# Patient Record
Sex: MALE | Race: NATIVE HAWAIIAN OR OTHER PACIFIC ISLANDER | URBAN - METROPOLITAN AREA
[De-identification: names, ages, dates, MRNs, and addresses within clinical notes are randomized per-mention and may not be internally consistent; named-entity substitution may affect disease eponyms.]

---

## 2020-08-27 ENCOUNTER — OFFICE VISIT (OUTPATIENT)
Dept: URBAN - METROPOLITAN AREA CLINIC 54 | Facility: CLINIC | Age: 20
End: 2020-08-27
Payer: COMMERCIAL

## 2020-08-27 DIAGNOSIS — R10.13 EPIGASTRIC ABDOMINAL PAIN: ICD-10-CM

## 2020-08-27 DIAGNOSIS — R74.8 ABNORMAL LIVER ENZYMES: ICD-10-CM

## 2020-08-27 DIAGNOSIS — K92.1 HEMATOCHEZIA: ICD-10-CM

## 2020-08-27 PROCEDURE — G8420 CALC BMI NORM PARAMETERS: HCPCS | Performed by: INTERNAL MEDICINE

## 2020-08-27 PROCEDURE — 99213 OFFICE O/P EST LOW 20 MIN: CPT | Performed by: INTERNAL MEDICINE

## 2020-08-27 PROCEDURE — 1036F TOBACCO NON-USER: CPT | Performed by: INTERNAL MEDICINE

## 2020-08-27 PROCEDURE — G8427 DOCREV CUR MEDS BY ELIG CLIN: HCPCS | Performed by: INTERNAL MEDICINE

## 2020-08-27 RX ORDER — MUPIROCIN 20 MG/G
1 APPLICATION OINTMENT TOPICAL THREE TIMES A DAY
Status: ACTIVE | COMMUNITY

## 2020-08-27 RX ORDER — PREDNISONE 20 MG/1
1 TABLET TABLET ORAL ONCE A DAY
Status: ACTIVE | COMMUNITY

## 2020-08-27 RX ORDER — FAMOTIDINE 20 MG/1
1 TABLET AT BEDTIME AS NEEDED TABLET, FILM COATED ORAL ONCE A DAY
Status: ACTIVE | COMMUNITY

## 2020-08-27 RX ORDER — DICYCLOMINE HYDROCHLORIDE 20 MG/1
1 TABLET TABLET ORAL THREE TIMES A DAY
Status: ACTIVE | COMMUNITY

## 2020-08-27 RX ORDER — METRONIDAZOLE 500 MG/1
1 TABLET TABLET ORAL
Status: ACTIVE | COMMUNITY

## 2020-08-27 RX ORDER — DOCUSATE SODIUM 100 MG/1
CAPSULE ORAL
Qty: 0 | Refills: 0 | Status: ACTIVE | COMMUNITY
Start: 1900-01-01

## 2020-08-27 RX ORDER — CIPROFLOXACIN 500 MG/1
1 TABLET TABLET, FILM COATED ORAL
Status: ACTIVE | COMMUNITY

## 2020-08-27 RX ORDER — DIPHENHYDRAMINE HYDROCHLORIDE 25 MG/1
1 CAPSULE AT BEDTIME AS NEEDED CAPSULE ORAL ONCE A DAY
Status: ACTIVE | COMMUNITY

## 2020-08-27 RX ORDER — BUDESONIDE 3 MG/1
TAKE 3 CAPSULES BY ORAL ROUTE ONCE DAILY FOR 4 WEEKS THEN DECREASE TO 2 CAPSULES DAILY FOR 2 WEEKS THEN 1 CAPSULE FOR 2 WEEKS THEN STOP CAPSULE, COATED PELLETS ORAL 1
Qty: 132 | Refills: 0 | Status: DISCONTINUED | COMMUNITY
Start: 2019-01-04

## 2020-08-27 NOTE — HPI-TODAY'S VISIT:
Follow-up recent ER visit with mid abdominal pain and blood in the stool CT with equivocal colitis.  Prior history of budesonide for microscopic colitis with otherwise normal colonoscopy 2018.  Prior intermittent bleeding from hemorrhoids.  It is not clear whether he is currently having dark stools or rectal bleeding as he is not a good historian.  He is a little slow responding to questions.  He understands and speaks English fairly well but his sister is here to help with interpretation.  He has gained some weight.  He has had no nausea or vomiting.  He has had an episode of Salmonella 2 years ago.  He is not currently having diarrhea although that is been a problem in the past.  He actually has tendency toward constipation relieved by MiraLAX and a stool softener.  He has had no fever or coronavirus symptoms.  His recent labs were normal except for mild elevation of ALT and AST with no prior history of liver disease, alcohol use or other risk factors for hepatitis.  He has not had a prior upper endoscopy or abdominal ultrasound and has not had prior abdominal surgery.

## 2020-08-27 NOTE — HPI-OTHER HISTORIES
>> prior visits  evaluation for diarrhea which has been watery, and he has been having about 6 bowel movements per day when the symptoms occur. A copy of this document will be sent to the referring provider. The diarrhea has been present daily since it began approximately 1 month ago. Besides diarrhea , the patient complains of abdominal pain and hematochezia. They have had hematochezia for 1 week. When there is blood, the color is bright red, and the bleeding is intermittently. He reports abdominal pain which is dull and aching in nature. It has been present for approximately 1 month. The pain is located in the lower quadrants. When the pain occurs it is mild in severity, and has a crampy quality. The pain seems to occur at random times. The pain is improved with nothing in particular. He has not traveled out of the country. The patient states no additional potential causes. Diagnostic Studies: Diagnostic Studies labwork and CT done recently at hospital PMH-NONE PSH-TESTICULAR SURGERY ALL-NKDA SH-DENIES TOBACCO ETOH OR DRUG USE FH-DENIES IBD SYMPTOMS  10/4/18: Patient returns for follow up. He underwent colonoscopy on 8/23/18, benign bx of TI, random colon bx show microscopic colitis (lymphocytic). A prescription for budesonide was sent to [facility], however patient's father states it was never mailed to them. Patient has been doing well. He does not have any further diarrhea or bloody stools. No abdominal pain, N/V.   FOLLOW UP 1/29/19-PATIENT PRESENTS FOR FOLLOW UP. HE RECENTLY HAD STOOL TESTNING WHICH WAS NEGATIVE FOR INFECTION OR INFLAMMATION. HE COMPLAINS OF BLOOD ON TOILET TISSUE WHEN HE WIPES. HE DOES STRAIN SOME WHEN GOING TO BATHROOM. HE HAS HAD A RECENT RECTAL EXAM. FOLLOW UP 3/26/19-PATIENT PRESENTS FOR FOLLOW UP. HE STATES HE HAD ANOTHER EPISODE OF HEMORRHOID BLEEDING RECENTLY. HE HAS BEEN INCONSISTENTLY TAKING BENEFIBER AND DID NOT TAKE ANUSOL CREAM FROM LAST VISIT. HE STATES HE IS HAVING 2 BOWEL MOVEMENTS DAILY WHICH ARE SOFT. HE DENIES ANY NEW SYMPTOMS. FOLLOW UP 5/21/19-PATIENT PRESENTS FOR FOLLOW UP. HE IS NOT TAKING BENEFIBER BUT DID USE ANUSOL CREAM WHICH STOPPED HIS HEMORRHOIDAL DISCOMFORT. HE DENIES CURRENT BLEEDING.   8/20/19: Patient returns with c/o recurrent rectal bleeding x 1 episode on 8/7/19. He reports a large amount of blood in his underwear. Denies any further bleeding since then. No rectal pain. He admits to straning with BMs. Stool is occasionally hard. Denies any rectal pain. Reports anal itching from hemorrhoids. Had been using Anusol-HC, but is out. He has been taking Benefiber morning and night. He has not tried any other laxatives or stool softeners. Denies any abdominal pain, N/V.    FOLLOW UP 10/29/19-PATIENT PRESENTS FOR FOLLOW UP. HE IS TAKING BENEFIBER WHICH IS HELPING HIS CONSTIPATION AND STRAINING.

## 2020-08-28 LAB
A/G RATIO: 1.7
ALBUMIN: 5
ALKALINE PHOSPHATASE: 105
ALT (SGPT): 87
AST (SGOT): 53
BASO (ABSOLUTE): 0
BASOS: 0
BILIRUBIN, TOTAL: 0.3
BUN/CREATININE RATIO: 15
BUN: 10
CALCIUM: 9.9
CARBON DIOXIDE, TOTAL: 19
CHLORIDE: 103
CREATININE: 0.65
EGFR IF AFRICN AM: 162
EGFR IF NONAFRICN AM: 140
EOS (ABSOLUTE): 0.2
EOS: 2
GLOBULIN, TOTAL: 2.9
GLUCOSE: 96
HBSAG SCREEN: NEGATIVE
HCV AB: <0.1
HEMATOCRIT: 47.6
HEMATOLOGY COMMENTS:: (no result)
HEMOGLOBIN: 15.4
HEP A AB, IGM: NEGATIVE
HEP A AB, TOTAL: NEGATIVE
HEP B CORE AB, IGM: NEGATIVE
HEP B CORE AB, TOT: NEGATIVE
HEP B SURFACE AB, QUAL: NON REACTIVE
HEP BE AB: NEGATIVE
HEP BE AG: NEGATIVE
IMMATURE CELLS: (no result)
IMMATURE GRANS (ABS): 0
IMMATURE GRANULOCYTES: 0
INTERPRETATION:: (no result)
LYMPHS (ABSOLUTE): 2.6
LYMPHS: 37
MCH: 26.5
MCHC: 32.4
MCV: 82
MONOCYTES(ABSOLUTE): 0.4
MONOCYTES: 6
NEUTROPHILS (ABSOLUTE): 3.8
NEUTROPHILS: 55
NRBC: (no result)
PLATELETS: 299
POTASSIUM: 4.5
PROTEIN, TOTAL: 7.9
RBC: 5.81
RDW: 13
SODIUM: 140
WBC: 7.1

## 2020-09-11 ENCOUNTER — OFFICE VISIT (OUTPATIENT)
Dept: URBAN - METROPOLITAN AREA CLINIC 53 | Facility: CLINIC | Age: 20
End: 2020-09-11
Payer: COMMERCIAL

## 2020-09-11 DIAGNOSIS — K76.0 FATTY (CHANGE OF) LIVER, NOT ELSEWHERE CLASSIFIED: ICD-10-CM

## 2020-09-11 PROCEDURE — 76700 US EXAM ABDOM COMPLETE: CPT | Performed by: INTERNAL MEDICINE

## 2020-09-30 ENCOUNTER — OFFICE VISIT (OUTPATIENT)
Dept: URBAN - METROPOLITAN AREA CLINIC 54 | Facility: CLINIC | Age: 20
End: 2020-09-30
Payer: COMMERCIAL

## 2020-09-30 DIAGNOSIS — R10.9 ABDOMINAL PAIN: ICD-10-CM

## 2020-09-30 DIAGNOSIS — K52.9 COLITIS: ICD-10-CM

## 2020-09-30 DIAGNOSIS — K92.1 HEMATOCHEZIA: ICD-10-CM

## 2020-09-30 PROCEDURE — 99213 OFFICE O/P EST LOW 20 MIN: CPT | Performed by: INTERNAL MEDICINE

## 2020-09-30 PROCEDURE — 1036F TOBACCO NON-USER: CPT | Performed by: INTERNAL MEDICINE

## 2020-09-30 PROCEDURE — G8427 DOCREV CUR MEDS BY ELIG CLIN: HCPCS | Performed by: INTERNAL MEDICINE

## 2020-09-30 PROCEDURE — G8420 CALC BMI NORM PARAMETERS: HCPCS | Performed by: INTERNAL MEDICINE

## 2020-09-30 RX ORDER — METRONIDAZOLE 500 MG/1
1 TABLET TABLET ORAL
Status: ACTIVE | COMMUNITY

## 2020-09-30 RX ORDER — PREDNISONE 20 MG/1
1 TABLET TABLET ORAL ONCE A DAY
Status: ACTIVE | COMMUNITY

## 2020-09-30 RX ORDER — DIPHENHYDRAMINE HYDROCHLORIDE 25 MG/1
1 CAPSULE AT BEDTIME AS NEEDED CAPSULE ORAL ONCE A DAY
Status: ACTIVE | COMMUNITY

## 2020-09-30 RX ORDER — DICYCLOMINE HYDROCHLORIDE 20 MG/1
1 TABLET TABLET ORAL THREE TIMES A DAY
Status: ACTIVE | COMMUNITY

## 2020-09-30 RX ORDER — FAMOTIDINE 20 MG/1
1 TABLET AT BEDTIME AS NEEDED TABLET, FILM COATED ORAL ONCE A DAY
Status: ACTIVE | COMMUNITY

## 2020-09-30 RX ORDER — CIPROFLOXACIN 500 MG/1
1 TABLET TABLET, FILM COATED ORAL
Status: ACTIVE | COMMUNITY

## 2020-09-30 RX ORDER — DOCUSATE SODIUM 100 MG/1
CAPSULE ORAL
Qty: 0 | Refills: 0 | Status: ACTIVE | COMMUNITY
Start: 1900-01-01

## 2020-09-30 RX ORDER — MUPIROCIN 20 MG/G
1 APPLICATION OINTMENT TOPICAL THREE TIMES A DAY
Status: ACTIVE | COMMUNITY

## 2020-09-30 NOTE — HPI-TODAY'S VISIT:
Follow-up visit for this 20-year-old  male.  He understands English but is a very poor historian.  He is accompanied by his sister who provides translation and amplification of the history.  He continues to have vague mid abdominal pain somewhat worse after meals.  He has intermittent diarrhea.  There is a question of microscopic colitis treated with budesonide 2 years ago.  He has not had an upper endoscopy.  There is no nausea or vomiting.  He has lost a small amount of weight.  There is been no recent bleeding.  He is currently taking dicyclomine and famotidine with some improvement in symptoms. Follow-up visit for this 20-year-old  male.  He understands English but is a very poor historian.  He is accompanied by his sister who provides translation and amplification of the history.  He continues to have vague mid abdominal pain somewhat worse after meals.  He has intermittent diarrhea.  There is a question of microscopic colitis treated with budesonide 2 years ago.  He has not had an upper endoscopy.  There is no nausea or vomiting.  He has lost a small amount of weight.  There is been no recent bleeding.  He is currently taking dicyclomine and famotidine with some improvement in symptoms.

## 2020-10-19 ENCOUNTER — OFFICE VISIT (OUTPATIENT)
Dept: URBAN - METROPOLITAN AREA SURGERY CENTER 14 | Facility: SURGERY CENTER | Age: 20
End: 2020-10-19
Payer: COMMERCIAL

## 2020-10-19 DIAGNOSIS — R19.4 CHANGE IN BOWEL HABIT: ICD-10-CM

## 2020-10-19 DIAGNOSIS — K63.89 BACTERIAL OVERGROWTH SYNDROME: ICD-10-CM

## 2020-10-19 DIAGNOSIS — K29.30 CHRONIC SUPERFICIAL GASTRITIS: ICD-10-CM

## 2020-10-19 DIAGNOSIS — K31.89 ACQUIRED DEFORMITY OF DUODENUM: ICD-10-CM

## 2020-10-19 PROCEDURE — 45380 COLONOSCOPY AND BIOPSY: CPT | Performed by: INTERNAL MEDICINE

## 2020-10-19 PROCEDURE — G8907 PT DOC NO EVENTS ON DISCHARG: HCPCS | Performed by: INTERNAL MEDICINE

## 2020-10-19 PROCEDURE — G9937 DIG OR SURV COLSCO: HCPCS | Performed by: INTERNAL MEDICINE

## 2020-10-19 PROCEDURE — 43239 EGD BIOPSY SINGLE/MULTIPLE: CPT | Performed by: INTERNAL MEDICINE

## 2020-10-19 RX ORDER — MUPIROCIN 20 MG/G
1 APPLICATION OINTMENT TOPICAL THREE TIMES A DAY
Status: ACTIVE | COMMUNITY

## 2020-10-19 RX ORDER — CIPROFLOXACIN 500 MG/1
1 TABLET TABLET, FILM COATED ORAL
Status: ACTIVE | COMMUNITY

## 2020-10-19 RX ORDER — DOCUSATE SODIUM 100 MG/1
CAPSULE ORAL
Qty: 0 | Refills: 0 | Status: ACTIVE | COMMUNITY
Start: 1900-01-01

## 2020-10-19 RX ORDER — DIPHENHYDRAMINE HYDROCHLORIDE 25 MG/1
1 CAPSULE AT BEDTIME AS NEEDED CAPSULE ORAL ONCE A DAY
Status: ACTIVE | COMMUNITY

## 2020-10-19 RX ORDER — FAMOTIDINE 20 MG/1
1 TABLET AT BEDTIME AS NEEDED TABLET, FILM COATED ORAL ONCE A DAY
Status: ACTIVE | COMMUNITY

## 2020-10-19 RX ORDER — DICYCLOMINE HYDROCHLORIDE 20 MG/1
1 TABLET TABLET ORAL THREE TIMES A DAY
Status: ACTIVE | COMMUNITY

## 2020-10-19 RX ORDER — PREDNISONE 20 MG/1
1 TABLET TABLET ORAL ONCE A DAY
Status: ACTIVE | COMMUNITY

## 2020-10-19 RX ORDER — METRONIDAZOLE 500 MG/1
1 TABLET TABLET ORAL
Status: ACTIVE | COMMUNITY

## 2020-12-11 ENCOUNTER — WEB ENCOUNTER (OUTPATIENT)
Dept: URBAN - METROPOLITAN AREA CLINIC 54 | Facility: CLINIC | Age: 20
End: 2020-12-11

## 2020-12-11 ENCOUNTER — OFFICE VISIT (OUTPATIENT)
Dept: URBAN - METROPOLITAN AREA CLINIC 54 | Facility: CLINIC | Age: 20
End: 2020-12-11
Payer: COMMERCIAL

## 2020-12-11 DIAGNOSIS — K92.1 HEMATOCHEZIA: ICD-10-CM

## 2020-12-11 PROCEDURE — G8482 FLU IMMUNIZE ORDER/ADMIN: HCPCS | Performed by: INTERNAL MEDICINE

## 2020-12-11 PROCEDURE — G8420 CALC BMI NORM PARAMETERS: HCPCS | Performed by: INTERNAL MEDICINE

## 2020-12-11 PROCEDURE — 99213 OFFICE O/P EST LOW 20 MIN: CPT | Performed by: INTERNAL MEDICINE

## 2020-12-11 PROCEDURE — 1036F TOBACCO NON-USER: CPT | Performed by: INTERNAL MEDICINE

## 2020-12-11 PROCEDURE — G8427 DOCREV CUR MEDS BY ELIG CLIN: HCPCS | Performed by: INTERNAL MEDICINE

## 2020-12-11 RX ORDER — DOCUSATE SODIUM 100 MG/1
CAPSULE ORAL
Qty: 0 | Refills: 0 | Status: ACTIVE | COMMUNITY
Start: 1900-01-01

## 2020-12-11 RX ORDER — FAMOTIDINE 20 MG/1
1 TABLET AT BEDTIME AS NEEDED TABLET, FILM COATED ORAL ONCE A DAY
Status: ACTIVE | COMMUNITY

## 2020-12-11 RX ORDER — MUPIROCIN 20 MG/G
1 APPLICATION OINTMENT TOPICAL THREE TIMES A DAY
Status: ACTIVE | COMMUNITY

## 2020-12-11 RX ORDER — DICYCLOMINE HYDROCHLORIDE 20 MG/1
1 TABLET TABLET ORAL THREE TIMES A DAY
Status: ACTIVE | COMMUNITY

## 2020-12-11 RX ORDER — METRONIDAZOLE 500 MG/1
1 TABLET TABLET ORAL
Status: DISCONTINUED | COMMUNITY

## 2020-12-11 RX ORDER — PREDNISONE 20 MG/1
1 TABLET TABLET ORAL ONCE A DAY
Status: ACTIVE | COMMUNITY

## 2020-12-11 RX ORDER — DIPHENHYDRAMINE HYDROCHLORIDE 25 MG/1
1 CAPSULE AT BEDTIME AS NEEDED CAPSULE ORAL ONCE A DAY
Status: ACTIVE | COMMUNITY

## 2020-12-11 RX ORDER — CIPROFLOXACIN 500 MG/1
1 TABLET TABLET, FILM COATED ORAL
Status: DISCONTINUED | COMMUNITY

## 2020-12-11 NOTE — HPI-OTHER HISTORIES
>>prior visits  The patient is a 17 year old,  or  male , who presents on referral from ER, for a gastroenterology evaluation for diarrhea which has been watery, and he has been having about 6 bowel movements per day when the symptoms occur. A copy of this document will be sent to the referring provider. The diarrhea has been present daily since it began approximately 1 month ago. Besides diarrhea , the patient complains of abdominal pain and hematochezia. They have had hematochezia for 1 week. When there is blood, the color is bright red, and the bleeding is intermittently. He reports abdominal pain which is dull and aching in nature. It has been present for approximately 1 month. The pain is located in the lower quadrants. When the pain occurs it is mild in severity, and has a crampy quality. The pain seems to occur at random times. The pain is improved with nothing in particular. He has not traveled out of the country. The patient states no additional potential causes. Diagnostic Studies: Diagnostic Studies labwork and CT done recently at hospital PMH-NONE PSH-TESTICULAR SURGERY ALL-NKDA SH-DENIES TOBACCO ETOH OR DRUG USE FH-DENIES IBD SYMPTOMS   10/4/18: Patient returns for follow up. He underwent colonoscopy on 8/23/18, benign bx of TI, random colon bx show microscopic colitis (lymphocytic). A prescription for budesonide was sent to [facility] pharmacy, however patient's father states it was never mailed to them. Patient has been doing well. He does not have any further diarrhea or bloody stools. No abdominal pain, N/V.   FOLLOW UP 1/29/19-PATIENT PRESENTS FOR FOLLOW UP. HE RECENTLY HAD STOOL TESTNING WHICH WAS NEGATIVE FOR INFECTION OR INFLAMMATION. HE COMPLAINS OF BLOOD ON TOILET TISSUE WHEN HE WIPES. HE DOES STRAIN SOME WHEN GOING TO BATHROOM. HE HAS HAD A RECENT RECTAL EXAM.   FOLLOW UP 3/26/19-PATIENT PRESENTS FOR FOLLOW UP. HE STATES HE HAD ANOTHER EPISODE OF HEMORRHOID BLEEDING RECENTLY. HE HAS BEEN INCONSISTENTLY TAKING BENEFIBER AND DID NOT TAKE ANUSOL CREAM FROM LAST VISIT. HE STATES HE IS HAVING 2 BOWEL MOVEMENTS DAILY WHICH ARE SOFT. HE DENIES ANY NEW SYMPTOMS.   FOLLOW UP 5/21/19-PATIENT PRESENTS FOR FOLLOW UP. HE IS NOT TAKING BENEFIBER BUT DID USE ANUSOL CREAM WHICH STOPPED HIS HEMORRHOIDAL DISCOMFORT. HE DENIES CURRENT BLEEDING. 8/20/19: Patient returns with c/o recurrent rectal bleeding x 1 episode on 8/7/19. He reports a large amount of blood in his underwear. Denies any further bleeding since then. No rectal pain. He admits to straning with BMs. Stool is occasionally hard. Denies any rectal pain. Reports anal itching from hemorrhoids. Had been using Anusol-HC, but is out. He has been taking Benefiber morning and night. He has not tried any other laxatives or stool softeners. Denies any abdominal pain, N/V.   FOLLOW UP 10/29/19-PATIENT PRESENTS FOR FOLLOW UP. HE IS TAKING BENEFIBER WHICH IS HELPING HIS CONSTIPATION AND STRAINING.

## 2020-12-11 NOTE — PHYSICAL EXAM SKIN:
thickening and fissuring dorsal aspects of hands and fingers , no suspicious lesions , no areas of discoloration , no jaundice present , good turgor , no masses , no tenderness on palpation

## 2020-12-11 NOTE — HPI-TODAY'S VISIT:
Follow-up after upper endoscopy and colonoscopy, follow-up after ER visit.  He was seen in the ER on 10 5 and underwent CT which had revealed suspicious thickening of the sigmoid and rectum.  Colonoscopy done since then has revealed normal mucosa with no inflammatory changes either visually or on random biopsies.  An upper endoscopy was also unremarkable with negative biopsies of stomach and duodenum.  He has occasional minor rectal bleeding related to hemorrhoids and occasional abdominal pain but not severe.  He is also noted some mild thickening and fissuring on the backs of his hands.  He is a somewhat difficult historian assisted with English by his sister.  It is possible that the breaking out has followed treatment with Cipro and cephalexin from the 10 5 ER visit.  He otherwise feels well.  >>prior visit  Follow-up visit for this 20-year-old  male.  He understands English but is a very poor historian.  He is accompanied by his sister who provides translation and amplification of the history.  He continues to have vague mid abdominal pain somewhat worse after meals.  He has intermittent diarrhea.  There is a question of microscopic colitis treated with budesonide 2 years ago.  He has not had an upper endoscopy.  There is no nausea or vomiting.  He has lost a small amount of weight.  There is been no recent bleeding.  He is currently taking dicyclomine and famotidine with some improvement in symptoms. Follow-up visit for this 20-year-old  male.  He understands English but is a very poor historian.  He is accompanied by his sister who provides translation and amplification of the history.  He continues to have vague mid abdominal pain somewhat worse after meals.  He has intermittent diarrhea.  There is a question of microscopic colitis treated with budesonide 2 years ago.  He has not had an upper endoscopy.  There is no nausea or vomiting.  He has lost a small amount of weight.  There is been no recent bleeding.  He is currently taking dicyclomine and famotidine with some improvement in symptoms.

## 2020-12-30 ENCOUNTER — TELEPHONE ENCOUNTER (OUTPATIENT)
Dept: URBAN - METROPOLITAN AREA CLINIC 54 | Facility: CLINIC | Age: 20
End: 2020-12-30

## 2021-01-06 ENCOUNTER — TELEPHONE ENCOUNTER (OUTPATIENT)
Dept: URBAN - METROPOLITAN AREA CLINIC 54 | Facility: CLINIC | Age: 21
End: 2021-01-06

## 2021-04-12 ENCOUNTER — TELEPHONE ENCOUNTER (OUTPATIENT)
Dept: URBAN - METROPOLITAN AREA CLINIC 74 | Facility: CLINIC | Age: 21
End: 2021-04-12

## 2021-05-12 ENCOUNTER — OFFICE VISIT (OUTPATIENT)
Dept: URBAN - METROPOLITAN AREA CLINIC 54 | Facility: CLINIC | Age: 21
End: 2021-05-12
Payer: COMMERCIAL

## 2021-05-12 ENCOUNTER — WEB ENCOUNTER (OUTPATIENT)
Dept: URBAN - METROPOLITAN AREA CLINIC 54 | Facility: CLINIC | Age: 21
End: 2021-05-12

## 2021-05-12 DIAGNOSIS — K92.1 HEMATOCHEZIA: ICD-10-CM

## 2021-05-12 PROCEDURE — 99213 OFFICE O/P EST LOW 20 MIN: CPT | Performed by: INTERNAL MEDICINE

## 2021-05-12 RX ORDER — PREDNISONE 20 MG/1
1 TABLET TABLET ORAL ONCE A DAY
Status: ACTIVE | COMMUNITY

## 2021-05-12 RX ORDER — DICYCLOMINE HYDROCHLORIDE 20 MG/1
1 TABLET TABLET ORAL THREE TIMES A DAY
Status: ACTIVE | COMMUNITY

## 2021-05-12 RX ORDER — MUPIROCIN 20 MG/G
1 APPLICATION OINTMENT TOPICAL THREE TIMES A DAY
Status: ACTIVE | COMMUNITY

## 2021-05-12 RX ORDER — FAMOTIDINE 20 MG/1
1 TABLET AT BEDTIME AS NEEDED TABLET, FILM COATED ORAL ONCE A DAY
Status: ACTIVE | COMMUNITY

## 2021-05-12 RX ORDER — DOCUSATE SODIUM 100 MG/1
CAPSULE ORAL
Qty: 0 | Refills: 0 | Status: ACTIVE | COMMUNITY
Start: 1900-01-01

## 2021-05-12 RX ORDER — DIPHENHYDRAMINE HYDROCHLORIDE 25 MG/1
1 CAPSULE AT BEDTIME AS NEEDED CAPSULE ORAL ONCE A DAY
Status: ACTIVE | COMMUNITY

## 2021-05-12 RX ORDER — DICYCLOMINE HYDROCHLORIDE 20 MG/1
1 TABLET TABLET ORAL THREE TIMES A DAY
Qty: 180 TABLET | Refills: 1

## 2021-05-12 NOTE — HPI-TODAY'S VISIT:
20-year-old man with minor recurrent rectal bleeding and persistent left lower quadrant abdominal pain.  The pain seems to be somewhat worse after meals but is inconsistent.  It is not severe.  He has taken dicyclomine in the past but erratically.  It does seem to help.  He was seen in the ER last month and a CT and labs performed which revealed none specific mild thickening of the sigmoid.  There were no other major abnormalities.  His lab work was unremarkable.  A previous colonoscopy done recently revealed minimal hemorrhoids and no other pathology in the colon.  An upper endoscopy was also unremarkable.  His weight has been stable.  He is a somewhat difficult historian as usual.   >> last visit Follow-up after upper endoscopy and colonoscopy, follow-up after ER visit.  He was seen in the ER on 10 5 and underwent CT which had revealed suspicious thickening of the sigmoid and rectum.  Colonoscopy done since then has revealed normal mucosa with no inflammatory changes either visually or on random biopsies.  An upper endoscopy was also unremarkable with negative biopsies of stomach and duodenum.  He has occasional minor rectal bleeding related to hemorrhoids and occasional abdominal pain but not severe.  He is also noted some mild thickening and fissuring on the backs of his hands.  He is a somewhat difficult historian assisted with English by his sister.  It is possible that the breaking out has followed treatment with Cipro and cephalexin from the 10 5 ER visit.  He otherwise feels well.  >>prior visit  Follow-up visit for this 20-year-old  male.  He understands English but is a very poor historian.  He is accompanied by his sister who provides translation and amplification of the history.  He continues to have vague mid abdominal pain somewhat worse after meals.  He has intermittent diarrhea.  There is a question of microscopic colitis treated with budesonide 2 years ago.  He has not had an upper endoscopy.  There is no nausea or vomiting.  He has lost a small amount of weight.  There is been no recent bleeding.  He is currently taking dicyclomine and famotidine with some improvement in symptoms. Follow-up visit for this 20-year-old  male.  He understands English but is a very poor historian.  He is accompanied by his sister who provides translation and amplification of the history.  He continues to have vague mid abdominal pain somewhat worse after meals.  He has intermittent diarrhea.  There is a question of microscopic colitis treated with budesonide 2 years ago.  He has not had an upper endoscopy.  There is no nausea or vomiting.  He has lost a small amount of weight.  There is been no recent bleeding.  He is currently taking dicyclomine and famotidine with some improvement in symptoms.

## 2021-05-12 NOTE — HPI-OTHER HISTORIES
>>prior visits  The patient is a 17 year old,  or  male , who presents on referral from ER, for a gastroenterology evaluation for diarrhea which has been watery, and he has been having about 6 bowel movements per day when the symptoms occur. A copy of this document will be sent to the referring provider. The diarrhea has been present daily since it began approximately 1 month ago. Besides diarrhea , the patient complains of abdominal pain and hematochezia. They have had hematochezia for 1 week. When there is blood, the color is bright red, and the bleeding is intermittently. He reports abdominal pain which is dull and aching in nature. It has been present for approximately 1 month. The pain is located in the lower quadrants. When the pain occurs it is mild in severity, and has a crampy quality. The pain seems to occur at random times. The pain is improved with nothing in particular. He has not traveled out of the country. The patient states no additional potential causes. Diagnostic Studies: Diagnostic Studies labwork and CT done recently at hospital PMH-NONE PSH-TESTICULAR SURGERY ALL-NKDA SH-DENIES TOBACCO ETOH OR DRUG USE FH-DENIES IBD SYMPTOMS   10/4/18: Patient returns for follow up. He underwent colonoscopy on 8/23/18, benign bx of TI, random colon bx show microscopic colitis (lymphocytic). A prescription for budesonide was sent to [facility] pharmacy, however patient's father states it was never mailed to them. Patient has been doing well. He does not have any further diarrhea or bloody stools. No abdominal pain, N/V.   FOLLOW UP 1/29/19-PATIENT PRESENTS FOR FOLLOW UP. HE RECENTLY HAD STOOL TESTNING WHICH WAS NEGATIVE FOR INFECTION OR INFLAMMATION. HE COMPLAINS OF BLOOD ON TOILET TISSUE WHEN HE WIPES. HE DOES STRAIN SOME WHEN GOING TO BATHROOM. HE HAS HAD A RECENT RECTAL EXAM.   FOLLOW UP 3/26/19-PATIENT PRESENTS FOR FOLLOW UP. HE STATES HE HAD ANOTHER EPISODE OF HEMORRHOID BLEEDING RECENTLY. HE HAS BEEN INCONSISTENTLY TAKING BENEFIBER AND DID NOT TAKE ANUSOL CREAM FROM LAST VISIT. HE STATES HE IS HAVING 2 BOWEL MOVEMENTS DAILY WHICH ARE SOFT. HE DENIES ANY NEW SYMPTOMS.   FOLLOW UP 5/21/19-PATIENT PRESENTS FOR FOLLOW UP. HE IS NOT TAKING BENEFIBER BUT DID USE ANUSOL CREAM WHICH STOPPED HIS HEMORRHOIDAL DISCOMFORT. HE DENIES CURRENT BLEEDING. 8/20/19: Patient returns with c/o recurrent rectal bleeding x 1 episode on 8/7/19. He reports a large amount of blood in his underwear. Denies any further bleeding since then. No rectal pain. He admits to straning with BMs. Stool is occasionally hard. Denies any rectal pain. Reports anal itching from hemorrhoids. Had been using Anusol-HC, but is out. He has been taking Benefiber morning and night. He has not tried any other laxatives or stool softeners. Denies any abdominal pain, N/V.   FOLLOW UP 10/29/19-PATIENT PRESENTS FOR FOLLOW UP. HE IS TAKING BENEFIBER WHICH IS HELPING HIS CONSTIPATION AND STRAINING.

## 2021-06-11 ENCOUNTER — OFFICE VISIT (OUTPATIENT)
Dept: URBAN - METROPOLITAN AREA CLINIC 54 | Facility: CLINIC | Age: 21
End: 2021-06-11

## 2021-06-11 RX ORDER — PREDNISONE 20 MG/1
1 TABLET TABLET ORAL ONCE A DAY
Status: ACTIVE | COMMUNITY

## 2021-06-11 RX ORDER — DIPHENHYDRAMINE HYDROCHLORIDE 25 MG/1
1 CAPSULE AT BEDTIME AS NEEDED CAPSULE ORAL ONCE A DAY
Status: ACTIVE | COMMUNITY

## 2021-06-11 RX ORDER — MUPIROCIN 20 MG/G
1 APPLICATION OINTMENT TOPICAL THREE TIMES A DAY
Status: ACTIVE | COMMUNITY

## 2021-06-11 RX ORDER — DOCUSATE SODIUM 100 MG/1
CAPSULE ORAL
Qty: 0 | Refills: 0 | Status: ACTIVE | COMMUNITY
Start: 1900-01-01

## 2021-06-11 RX ORDER — DICYCLOMINE HYDROCHLORIDE 20 MG/1
1 TABLET TABLET ORAL THREE TIMES A DAY
Qty: 180 TABLET | Refills: 1 | Status: ACTIVE | COMMUNITY

## 2021-06-11 RX ORDER — FAMOTIDINE 20 MG/1
1 TABLET AT BEDTIME AS NEEDED TABLET, FILM COATED ORAL ONCE A DAY
Status: ACTIVE | COMMUNITY

## 2021-08-11 ENCOUNTER — OFFICE VISIT (OUTPATIENT)
Dept: URBAN - METROPOLITAN AREA CLINIC 54 | Facility: CLINIC | Age: 21
End: 2021-08-11

## 2021-08-26 ENCOUNTER — OFFICE VISIT (OUTPATIENT)
Dept: URBAN - METROPOLITAN AREA CLINIC 54 | Facility: CLINIC | Age: 21
End: 2021-08-26
Payer: COMMERCIAL

## 2021-08-26 ENCOUNTER — WEB ENCOUNTER (OUTPATIENT)
Dept: URBAN - METROPOLITAN AREA CLINIC 54 | Facility: CLINIC | Age: 21
End: 2021-08-26

## 2021-08-26 DIAGNOSIS — K52.9 COLITIS: ICD-10-CM

## 2021-08-26 DIAGNOSIS — K92.1 HEMATOCHEZIA: ICD-10-CM

## 2021-08-26 DIAGNOSIS — U07.1 COVID-19: ICD-10-CM

## 2021-08-26 PROCEDURE — 99213 OFFICE O/P EST LOW 20 MIN: CPT | Performed by: INTERNAL MEDICINE

## 2021-08-26 RX ORDER — FAMOTIDINE 20 MG/1
1 TABLET AT BEDTIME AS NEEDED TABLET, FILM COATED ORAL ONCE A DAY
Status: DISCONTINUED | COMMUNITY

## 2021-08-26 RX ORDER — DOCUSATE SODIUM 100 MG/1
CAPSULE ORAL
Qty: 0 | Refills: 0 | Status: DISCONTINUED | COMMUNITY
Start: 1900-01-01

## 2021-08-26 RX ORDER — PREDNISONE 20 MG/1
1 TABLET TABLET ORAL ONCE A DAY
Status: DISCONTINUED | COMMUNITY

## 2021-08-26 RX ORDER — DICYCLOMINE HYDROCHLORIDE 20 MG/1
1 TABLET TABLET ORAL THREE TIMES A DAY
Qty: 180 TABLET | Refills: 1 | Status: ACTIVE | COMMUNITY

## 2021-08-26 RX ORDER — DIPHENHYDRAMINE HYDROCHLORIDE 25 MG/1
1 CAPSULE AT BEDTIME AS NEEDED CAPSULE ORAL ONCE A DAY
Status: DISCONTINUED | COMMUNITY

## 2021-08-26 RX ORDER — MUPIROCIN 20 MG/G
1 APPLICATION OINTMENT TOPICAL THREE TIMES A DAY
Status: DISCONTINUED | COMMUNITY

## 2021-08-26 NOTE — HPI-TODAY'S VISIT:
20-year-old male with mild chronic digestive issues, occasional hematochezia from hemorrhoids and negative upper and lower endoscopic examinations.  Sigmoid thickening apparent on CT scan not confirmed with biopsies and endoscopic visualization of the sigmoid.  His bowels move fairly well currently he has minimal abdominal pain generally controlled with dicyclomine.  He is eating well weight is remained stable. Covid last month treated with a short course of steroids with overnight hospitalization now fully recovered.

## 2022-02-25 ENCOUNTER — WEB ENCOUNTER (OUTPATIENT)
Dept: URBAN - METROPOLITAN AREA CLINIC 54 | Facility: CLINIC | Age: 22
End: 2022-02-25

## 2022-02-25 ENCOUNTER — DASHBOARD ENCOUNTERS (OUTPATIENT)
Age: 22
End: 2022-02-25

## 2022-02-25 ENCOUNTER — OFFICE VISIT (OUTPATIENT)
Dept: URBAN - METROPOLITAN AREA CLINIC 54 | Facility: CLINIC | Age: 22
End: 2022-02-25
Payer: COMMERCIAL

## 2022-02-25 VITALS
BODY MASS INDEX: 39.67 KG/M2 | HEART RATE: 96 BPM | TEMPERATURE: 97.5 F | WEIGHT: 232.4 LBS | SYSTOLIC BLOOD PRESSURE: 154 MMHG | DIASTOLIC BLOOD PRESSURE: 106 MMHG | HEIGHT: 64 IN

## 2022-02-25 DIAGNOSIS — K52.9 COLITIS: ICD-10-CM

## 2022-02-25 DIAGNOSIS — R10.9 ABDOMINAL PAIN: ICD-10-CM

## 2022-02-25 DIAGNOSIS — K92.1 HEMATOCHEZIA: ICD-10-CM

## 2022-02-25 PROCEDURE — 99213 OFFICE O/P EST LOW 20 MIN: CPT

## 2022-02-25 RX ORDER — DICYCLOMINE HYDROCHLORIDE 20 MG/1
1 TABLET TABLET ORAL THREE TIMES A DAY
Qty: 180 TABLET | Refills: 1 | Status: ACTIVE | COMMUNITY

## 2022-02-25 RX ORDER — UBIDECARENONE 50 MG
AS DIRECTED CAPSULE ORAL
Status: ACTIVE | COMMUNITY

## 2022-02-25 RX ORDER — DICYCLOMINE HYDROCHLORIDE 20 MG/1
1 TABLET TABLET ORAL THREE TIMES A DAY
Qty: 180 TABLET | Refills: 3

## 2022-02-25 NOTE — HPI-TODAY'S VISIT:
8/26/21: 20-year-old male with mild chronic digestive issues, occasional hematochezia from hemorrhoids and negative upper and lower endoscopic examinations.  Sigmoid thickening apparent on CT scan not confirmed with biopsies and endoscopic visualization of the sigmoid.  His bowels move fairly well currently he has minimal abdominal pain generally controlled with dicyclomine.  He is eating well weight is remained stable. Covid last month treated with a short course of steroids with overnight hospitalization now fully recovered.   2/25/22: Pt here for follow up of abd pain and hematochezia. Pt is a difficult historian but is feeling better overall. Has crampy, lower abd pain about once a week that improves with defecation. Having a soft BM every other day with minimal straining. Rectal bleeding has improved, occasionally notes small amount of bright red blood on the toilet paper. Pt takes dicyclomine as needed for abd pain which helps. Pt takes fiber supplement daily. Pt has gained 15 lbs since last visit.

## 2022-02-25 NOTE — PHYSICAL EXAM CONSTITUTIONAL:
well developed, well nourished , obese, in no acute distress , ambulating without difficulty , normal communication ability

## 2022-02-25 NOTE — PHYSICAL EXAM GASTROINTESTINAL
Abdomen , soft, mild lower abd tenderness, nondistended , no guarding or rigidity , no masses palpable , normal bowel sounds , Liver and Spleen , no hepatomegaly present , no hepatosplenomegaly , liver nontender , spleen not palpable

## 2024-08-29 ENCOUNTER — OFFICE VISIT (OUTPATIENT)
Dept: URBAN - METROPOLITAN AREA CLINIC 54 | Facility: CLINIC | Age: 24
End: 2024-08-29
Payer: COMMERCIAL

## 2024-08-29 ENCOUNTER — LAB OUTSIDE AN ENCOUNTER (OUTPATIENT)
Dept: URBAN - METROPOLITAN AREA CLINIC 54 | Facility: CLINIC | Age: 24
End: 2024-08-29

## 2024-08-29 ENCOUNTER — DASHBOARD ENCOUNTERS (OUTPATIENT)
Age: 24
End: 2024-08-29

## 2024-08-29 VITALS
DIASTOLIC BLOOD PRESSURE: 84 MMHG | TEMPERATURE: 97.6 F | HEIGHT: 64 IN | HEART RATE: 75 BPM | SYSTOLIC BLOOD PRESSURE: 142 MMHG | BODY MASS INDEX: 39.44 KG/M2 | WEIGHT: 231 LBS

## 2024-08-29 DIAGNOSIS — R10.33 PERIUMBILICAL PAIN: ICD-10-CM

## 2024-08-29 DIAGNOSIS — K92.1 HEMATOCHEZIA: ICD-10-CM

## 2024-08-29 DIAGNOSIS — R19.5 STOOL GUAIAC POSITIVE: ICD-10-CM

## 2024-08-29 PROCEDURE — 99214 OFFICE O/P EST MOD 30 MIN: CPT

## 2024-08-29 NOTE — PHYSICAL EXAM GASTROINTESTINAL
Abdomen , nondistended, soft, mild periumbilical tenderness, no rebound or guarding, no masses palpable

## 2024-08-29 NOTE — HPI-TODAY'S VISIT:
8/26/21: 20-year-old male with mild chronic digestive issues, occasional hematochezia from hemorrhoids and negative upper and lower endoscopic examinations. Sigmoid thickening apparent on CT scan not confirmed with biopsies and endoscopic visualization of the sigmoid. His bowels move fairly well currently he has minimal abdominal pain generally controlled with dicyclomine. He is eating well weight is remained stable. Covid last month treated with a short course of steroids with overnight hospitalization now fully recovered.  2/25/22: Pt here for follow up of abd pain and hematochezia. Pt is a difficult historian but is feeling better overall. Has crampy, lower abd pain about once a week that improves with defecation. Having a soft BM every other day with minimal straining. Rectal bleeding has improved, occasionally notes small amount of bright red blood on the toilet paper. Pt takes dicyclomine as needed for abd pain which helps. Pt takes fiber supplement daily. Pt has gained 15 lbs since last visit.  8/29/24: Patient is a 24 yo male who presents for [facility] ER f/u from 8/25 for rectal bleeding. Pt reports having BRBPR with blood coating the stool and on the toilet paper x 4 days. Associated with periumbilical pain. No rectal pain. No changes in bowel habits. States he has regular BMs every other day. No nausea or vomiting. ER workup revealed normal rectal exam, no notable hemorrhoids or fissures, positive FOBT, normal Hgb 15.6. Mild, stable transaminitis: AST 46, ALT 84, AP 97, TB 0.28, Plt 287. Poor prep on cscope in 2018 with Dr. Gates, possible lymphocytic colitis on bx, slightly increased intraepithelial lymphocytes. Normal cscope in 2020 with Dr. Brown for chronic diarrhea, normal other than lymphoid aggregates. No hemorrhoids noted either time.

## 2024-09-04 ENCOUNTER — TELEPHONE ENCOUNTER (OUTPATIENT)
Dept: URBAN - METROPOLITAN AREA CLINIC 54 | Facility: CLINIC | Age: 24
End: 2024-09-04

## 2024-09-05 ENCOUNTER — OFFICE VISIT (OUTPATIENT)
Dept: URBAN - METROPOLITAN AREA SURGERY CENTER 14 | Facility: SURGERY CENTER | Age: 24
End: 2024-09-05

## 2024-09-26 ENCOUNTER — OFFICE VISIT (OUTPATIENT)
Dept: URBAN - METROPOLITAN AREA CLINIC 54 | Facility: CLINIC | Age: 24
End: 2024-09-26

## 2024-11-11 ENCOUNTER — LAB OUTSIDE AN ENCOUNTER (OUTPATIENT)
Dept: URBAN - METROPOLITAN AREA CLINIC 54 | Facility: CLINIC | Age: 24
End: 2024-11-11

## 2024-11-11 ENCOUNTER — OFFICE VISIT (OUTPATIENT)
Dept: URBAN - METROPOLITAN AREA SURGERY CENTER 14 | Facility: SURGERY CENTER | Age: 24
End: 2024-11-11
Payer: COMMERCIAL

## 2024-11-11 ENCOUNTER — TELEPHONE ENCOUNTER (OUTPATIENT)
Dept: URBAN - METROPOLITAN AREA CLINIC 54 | Facility: CLINIC | Age: 24
End: 2024-11-11

## 2024-11-11 DIAGNOSIS — K92.1 ACUTE MELENA: ICD-10-CM

## 2024-11-11 DIAGNOSIS — K92.1 HEMATOCHEZIA: ICD-10-CM

## 2024-11-11 DIAGNOSIS — K64.8 OTHER HEMORRHOIDS: ICD-10-CM

## 2024-11-11 PROCEDURE — 00811 ANES LWR INTST NDSC NOS: CPT | Performed by: NURSE ANESTHETIST, CERTIFIED REGISTERED

## 2024-11-11 PROCEDURE — 45378 DIAGNOSTIC COLONOSCOPY: CPT | Performed by: INTERNAL MEDICINE

## 2024-11-25 ENCOUNTER — OFFICE VISIT (OUTPATIENT)
Dept: URBAN - METROPOLITAN AREA CLINIC 54 | Facility: CLINIC | Age: 24
End: 2024-11-25

## 2024-12-11 ENCOUNTER — OFFICE VISIT (OUTPATIENT)
Dept: URBAN - METROPOLITAN AREA CLINIC 54 | Facility: CLINIC | Age: 24
End: 2024-12-11
Payer: COMMERCIAL

## 2024-12-11 VITALS
BODY MASS INDEX: 36.43 KG/M2 | HEART RATE: 85 BPM | HEIGHT: 64 IN | SYSTOLIC BLOOD PRESSURE: 124 MMHG | WEIGHT: 213.4 LBS | DIASTOLIC BLOOD PRESSURE: 81 MMHG | TEMPERATURE: 97.8 F

## 2024-12-11 DIAGNOSIS — K92.1 HEMATOCHEZIA: ICD-10-CM

## 2024-12-11 DIAGNOSIS — K64.8 INTERNAL HEMORRHOIDS: ICD-10-CM

## 2024-12-11 DIAGNOSIS — R10.33 PERIUMBILICAL PAIN: ICD-10-CM

## 2024-12-11 DIAGNOSIS — R19.5 STOOL GUAIAC POSITIVE: ICD-10-CM

## 2024-12-11 PROCEDURE — 99213 OFFICE O/P EST LOW 20 MIN: CPT

## 2024-12-11 RX ORDER — HYDROCORTISONE ACETATE 25 MG/1
1 SUPPOSITORY SUPPOSITORY RECTAL TWICE A DAY
Qty: 28 | Refills: 0 | OUTPATIENT
Start: 2024-12-11 | End: 2024-12-25

## 2024-12-11 NOTE — HPI-TODAY'S VISIT:
8/26/21: 20-year-old male with mild chronic digestive issues, occasional hematochezia from hemorrhoids and negative upper and lower endoscopic examinations. Sigmoid thickening apparent on CT scan not confirmed with biopsies and endoscopic visualization of the sigmoid. His bowels move fairly well currently he has minimal abdominal pain generally controlled with dicyclomine. He is eating well weight is remained stable. Covid last month treated with a short course of steroids with overnight hospitalization now fully recovered.  2/25/22: Pt here for follow up of abd pain and hematochezia. Pt is a difficult historian but is feeling better overall. Has crampy, lower abd pain about once a week that improves with defecation. Having a soft BM every other day with minimal straining. Rectal bleeding has improved, occasionally notes small amount of bright red blood on the toilet paper. Pt takes dicyclomine as needed for abd pain which helps. Pt takes fiber supplement daily. Pt has gained 15 lbs since last visit.  8/29/24: Patient is a 22 yo male who presents for [facility] ER f/u from 8/25 for rectal bleeding. Pt reports having BRBPR with blood coating the stool and on the toilet paper x 4 days. Associated with periumbilical pain. No rectal pain. No changes in bowel habits. States he has regular BMs every other day. No nausea or vomiting. ER workup revealed normal rectal exam, no notable hemorrhoids or fissures, positive FOBT, normal Hgb 15.6. Mild, stable transaminitis: AST 46, ALT 84, AP 97, TB 0.28, Plt 287. Poor prep on cscope in 2018 with Dr. Gates, possible lymphocytic colitis on bx, slightly increased intraepithelial lymphocytes. Normal cscope in 2020 with Dr. Brown for chronic diarrhea, normal other than lymphoid aggregates. No hemorrhoids noted either time.  12/11/24 Follow Up: Patient returns for colonoscopy follow up for BRBPR. Cscope revealed internal hemorrhoids. Attempted to schedule pt for banding but could not contact pt. Not interested in banding. BRBPR has resolved. No complaints.  used for mother.  Colonoscopy 11/11/24: - Internal hemorrhoids. - The entire examined colon is normal. - The examined portion of the ileum was normal. - No specimens collected.